# Patient Record
Sex: FEMALE | Race: WHITE | NOT HISPANIC OR LATINO | Employment: FULL TIME | URBAN - METROPOLITAN AREA
[De-identification: names, ages, dates, MRNs, and addresses within clinical notes are randomized per-mention and may not be internally consistent; named-entity substitution may affect disease eponyms.]

---

## 2023-11-01 ENCOUNTER — APPOINTMENT (OUTPATIENT)
Dept: NEUROSURGERY | Facility: CLINIC | Age: 53
End: 2023-11-01
Payer: COMMERCIAL

## 2023-11-02 PROBLEM — R92.8 ABNORMAL FINDING ON BREAST IMAGING: Status: ACTIVE | Noted: 2023-11-02

## 2023-11-03 ENCOUNTER — OFFICE VISIT (OUTPATIENT)
Dept: NEUROSURGERY | Facility: CLINIC | Age: 53
End: 2023-11-03
Payer: COMMERCIAL

## 2023-11-03 VITALS
HEART RATE: 100 BPM | SYSTOLIC BLOOD PRESSURE: 110 MMHG | WEIGHT: 114 LBS | DIASTOLIC BLOOD PRESSURE: 74 MMHG | BODY MASS INDEX: 22.38 KG/M2 | HEIGHT: 60 IN | TEMPERATURE: 97.7 F

## 2023-11-03 DIAGNOSIS — M54.12 RIGHT CERVICAL RADICULOPATHY: Primary | ICD-10-CM

## 2023-11-03 PROCEDURE — 1036F TOBACCO NON-USER: CPT | Performed by: NURSE PRACTITIONER

## 2023-11-03 PROCEDURE — 99204 OFFICE O/P NEW MOD 45 MIN: CPT | Performed by: NURSE PRACTITIONER

## 2023-11-03 RX ORDER — GABAPENTIN 300 MG/1
300 CAPSULE ORAL 2 TIMES DAILY
Qty: 60 CAPSULE | Refills: 0 | Status: SHIPPED | OUTPATIENT
Start: 2023-11-03 | End: 2024-02-07 | Stop reason: WASHOUT

## 2023-11-03 RX ORDER — GABAPENTIN 300 MG/1
300 CAPSULE ORAL 2 TIMES DAILY
COMMUNITY
Start: 2023-10-18 | End: 2023-11-03 | Stop reason: SDUPTHER

## 2023-11-03 ASSESSMENT — PATIENT HEALTH QUESTIONNAIRE - PHQ9
SUM OF ALL RESPONSES TO PHQ9 QUESTIONS 1 & 2: 0
2. FEELING DOWN, DEPRESSED OR HOPELESS: NOT AT ALL
1. LITTLE INTEREST OR PLEASURE IN DOING THINGS: NOT AT ALL

## 2023-11-03 ASSESSMENT — PAIN SCALES - GENERAL: PAINLEVEL: 1

## 2023-11-03 NOTE — PROGRESS NOTES
It was a pleasure to see Mikaela Canchola on 11/3/2023. She is a 53 y.o. year old, right-handed female who presents to the [facility] for evaluation of right cervical radiculopathy. Patient is referred by [facility]. No significant PMH.     Mikaela Canchola has had symptoms of RUE over the past 3 weeks, without inciting event. Progression of symptoms prompted today's visit. Thus far, patient has tried oral medications, and has just started PT / HEP with some improvement of symptoms. She denies change in bowel / bladder function, saddle anesthesia, imbalance, falls, difficulty dressing, difficulty holding / opening objects.     PREVIOUS TREATMENTS  PT current 11/01/2023, 10/25/2023  Home Exercise Program: daily since 10/25/2023  NSAIDs   Oral steroid   Gabapentin  Muscle relaxant  Topical    Previous Spine Surgery: No     Smoker: No   Anticoagulation / Antiplatelets: No     ROS: 12 / 12 systems reviewed and are negative unless noted in HPI    ON EXAM:  General: Well developed, awake/alert/oriented x 3, no distress, alert and cooperative  Skin: Warm and dry, no visible lesions / rashes  ENMT: Mucous membranes moist, no apparent injury  Head/Neck: No apparent injury. Full cervical spine ROM  Respiratory/Thorax: Normal breathing with good chest expansion, thorax symmetric  Cardiovascular: No pitting edema, no JVD  Gastrointestinal: Non-distended  NEUROLOGICAL EXAM:  EOMI, face symmetric  Motor Strength: DECREASED ELBOW FLEXION 4 / 5 on RIGHT; otherwise, 5/5 in BUE  Muscle Tone: Normal without spasticity or contractures in all extremities  Muscle Bulk: Normal and symmetric in all extremities  Posture:  -- Cervical: Normal  -- Thoracic: Normal  -- Lumbar : Normal  Paraspinal muscle spasm/tenderness absent.  No palpable tenderness along the spinous processes.  Sensation: DECREASED SENSORY IN RIGHT C6 DERMATOME; otherwise, SILT in BUE  Gait: Normal  Deep Tendon Reflexes: 2+ BUE, BLE     Christy's sign absent  Lhermitte Sign/Spurling Sign: MILD BILATERALLY  Finger escape sign: Absent   and release test: Negative    Mikaela Canchola has right cervical radiculopathy as evidenced on exam with SENSORY and MOTOR DEFICITS in RUE. Her symptoms of paresthesia, pain, weakness have improved with oral medications. We discussed rationale for dynamic cervical imaging and for continuation Physical Therapy for Home Exercise Program. Encouraged follow up in 4 weeks for reassessment. If improvement has not continued or if regression of symptoms, will request MRI CERVICAL Spine.  She verbalizes understanding and agreement. Refill of gabapentin sent to pharmacy of choice.  TOTAL TIME SPENT:   Time preparing / completing chart 7 MIN  Time spent face to face with patient, including counseling > 40 MIN

## 2023-11-10 ENCOUNTER — HOSPITAL ENCOUNTER (OUTPATIENT)
Dept: RADIOLOGY | Facility: HOSPITAL | Age: 53
Discharge: HOME | End: 2023-11-10
Payer: COMMERCIAL

## 2023-11-10 DIAGNOSIS — M54.12 RIGHT CERVICAL RADICULOPATHY: ICD-10-CM

## 2023-11-10 PROCEDURE — 72052 X-RAY EXAM NECK SPINE 6/>VWS: CPT

## 2023-11-10 PROCEDURE — 72052 X-RAY EXAM NECK SPINE 6/>VWS: CPT | Performed by: RADIOLOGY

## 2023-11-15 ENCOUNTER — TELEPHONE (OUTPATIENT)
Dept: NEUROSURGERY | Facility: CLINIC | Age: 53
End: 2023-11-15
Payer: COMMERCIAL

## 2023-12-06 ENCOUNTER — OFFICE VISIT (OUTPATIENT)
Dept: NEUROSURGERY | Facility: CLINIC | Age: 53
End: 2023-12-06
Payer: COMMERCIAL

## 2023-12-06 VITALS
DIASTOLIC BLOOD PRESSURE: 70 MMHG | SYSTOLIC BLOOD PRESSURE: 128 MMHG | TEMPERATURE: 98.4 F | BODY MASS INDEX: 21.99 KG/M2 | HEART RATE: 113 BPM | WEIGHT: 112 LBS | HEIGHT: 60 IN

## 2023-12-06 DIAGNOSIS — M54.12 RIGHT CERVICAL RADICULOPATHY: Primary | ICD-10-CM

## 2023-12-06 DIAGNOSIS — R20.2 PARESTHESIA OF RIGHT UPPER EXTREMITY: ICD-10-CM

## 2023-12-06 PROCEDURE — 1036F TOBACCO NON-USER: CPT | Performed by: NURSE PRACTITIONER

## 2023-12-06 PROCEDURE — 99213 OFFICE O/P EST LOW 20 MIN: CPT | Performed by: NURSE PRACTITIONER

## 2023-12-06 RX ORDER — DIAZEPAM 5 MG/1
5 TABLET ORAL 2 TIMES DAILY
Qty: 2 TABLET | Refills: 0 | Status: SHIPPED | OUTPATIENT
Start: 2023-12-06 | End: 2023-12-06 | Stop reason: SDUPTHER

## 2023-12-06 RX ORDER — DIAZEPAM 5 MG/1
5 TABLET ORAL EVERY 8 HOURS PRN
Qty: 30 TABLET | Refills: 0 | Status: SHIPPED | OUTPATIENT
Start: 2023-12-06 | End: 2023-12-06 | Stop reason: ENTERED-IN-ERROR

## 2023-12-06 RX ORDER — DIAZEPAM 5 MG/1
5 TABLET ORAL 2 TIMES DAILY
Qty: 2 TABLET | Refills: 0 | Status: SHIPPED | OUTPATIENT
Start: 2023-12-06 | End: 2024-02-07 | Stop reason: WASHOUT

## 2023-12-06 RX ORDER — CETIRIZINE HYDROCHLORIDE 10 MG/1
10 TABLET ORAL DAILY
COMMUNITY
Start: 2023-12-01

## 2023-12-06 RX ORDER — CODEINE PHOSPHATE AND GUAIFENESIN 10; 100 MG/5ML; MG/5ML
SOLUTION ORAL
COMMUNITY
Start: 2023-12-01 | End: 2024-02-07 | Stop reason: WASHOUT

## 2023-12-06 ASSESSMENT — PATIENT HEALTH QUESTIONNAIRE - PHQ9
1. LITTLE INTEREST OR PLEASURE IN DOING THINGS: NOT AT ALL
2. FEELING DOWN, DEPRESSED OR HOPELESS: NOT AT ALL
SUM OF ALL RESPONSES TO PHQ9 QUESTIONS 1 & 2: 0

## 2023-12-06 ASSESSMENT — ENCOUNTER SYMPTOMS: OCCASIONAL FEELINGS OF UNSTEADINESS: 0

## 2023-12-06 ASSESSMENT — PAIN SCALES - GENERAL: PAINLEVEL: 0-NO PAIN

## 2023-12-06 NOTE — PROGRESS NOTES
Mikaela Canchola is here today in follow up for right cervical radiculopathy and to review images. To review, she was initially evaluated on 11/03/2023. She reported RUE pain / paresthesia. On exam, she had motor weakness and sensory deficit in RUE. Orders were written for dynamic cervical spine x-rays and for PT.    Today, she feels that PT has not helped her symptoms. She continues with RUE pain / paresthesia. She had imaging completed and is here to review findings.    XR CERVICAL SPINE On 11/10/2023:  FINDINGS:  C-spine, 6 views  There is no fracture. There is no spondylolisthesis. There is mild  osteophytosis at C5-C6 without disc space narrowing      IMPRESSION:  Mild spondylosis at C5-C6. No malalignment or acute abnormality  Signed by: Bill Ortiz      TREATMENTS:  PT: 10/25/23, 11/1/23, 11/6/23 11/9/23, 11/9/23, 11/14/23, 11/21/23, 11/24/23, 11/28/23  Home exercise program: daily since 10/25/2023  Gabapentin    SMOKER: No  ANTICOAGULANT USE: No    ROS x 10 is, otherwise, negative unless documented in HPI above    On Exam: Appears comfortable  A&O x 4, speech clear / fluent  Respirations even / unlabored  Abdomen without distension  EPPS  Gait is steady    We reviewed cervical spine x-ray images with note of C5 - 6 disc space narrowing and foraminal narrowing noted on oblique views; we discussed MRI C SPINE since she has not responded to CONSERVATIVE MANAGEMENT SINCE 10/25/2023. MRI will be helpful to determine surgical vs non-surgical treatment of cervical radiculopathy given failure with conservative management.   TOTAL TIME:  Time preparing / completing chart: 7 MIN  Time in face to face contact with patient, including counseling: > 15 MIN  Nighat Bethea, APRN-CNP

## 2024-01-17 ENCOUNTER — HOSPITAL ENCOUNTER (OUTPATIENT)
Dept: RADIOLOGY | Facility: HOSPITAL | Age: 54
Discharge: HOME | End: 2024-01-17
Payer: COMMERCIAL

## 2024-01-17 DIAGNOSIS — R20.2 PARESTHESIA OF RIGHT UPPER EXTREMITY: ICD-10-CM

## 2024-01-17 DIAGNOSIS — M54.12 RIGHT CERVICAL RADICULOPATHY: ICD-10-CM

## 2024-01-17 PROCEDURE — 72141 MRI NECK SPINE W/O DYE: CPT

## 2024-01-17 PROCEDURE — 72141 MRI NECK SPINE W/O DYE: CPT | Performed by: RADIOLOGY

## 2024-02-07 ENCOUNTER — OFFICE VISIT (OUTPATIENT)
Dept: NEUROSURGERY | Facility: CLINIC | Age: 54
End: 2024-02-07
Payer: COMMERCIAL

## 2024-02-07 VITALS
SYSTOLIC BLOOD PRESSURE: 118 MMHG | HEART RATE: 106 BPM | HEIGHT: 60 IN | WEIGHT: 114 LBS | BODY MASS INDEX: 22.38 KG/M2 | DIASTOLIC BLOOD PRESSURE: 82 MMHG | TEMPERATURE: 98 F

## 2024-02-07 DIAGNOSIS — M54.12 RIGHT CERVICAL RADICULOPATHY: Primary | ICD-10-CM

## 2024-02-07 PROCEDURE — 1036F TOBACCO NON-USER: CPT | Performed by: NURSE PRACTITIONER

## 2024-02-07 PROCEDURE — 99214 OFFICE O/P EST MOD 30 MIN: CPT | Performed by: NURSE PRACTITIONER

## 2024-02-07 ASSESSMENT — PATIENT HEALTH QUESTIONNAIRE - PHQ9
2. FEELING DOWN, DEPRESSED OR HOPELESS: NOT AT ALL
SUM OF ALL RESPONSES TO PHQ9 QUESTIONS 1 & 2: 0
1. LITTLE INTEREST OR PLEASURE IN DOING THINGS: NOT AT ALL

## 2024-02-07 ASSESSMENT — PAIN SCALES - GENERAL: PAINLEVEL: 0-NO PAIN

## 2024-02-07 NOTE — PROGRESS NOTES
Mikaela Canchola is here today in follow up for right cervical radiculopathy and to review images. To review, she was last evaluated on 12/06/2023. She reported no improvement in right thumb paresthesia with PT / HEP.  Orders were written for MRI C Spine.    Today, she reports that she continues with symptoms. She had MRI C Spine imaging completed and is here to review findings.    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 Central canal stenosis is  most severe at C5-6. Disc extrusion at this level impresses on the  ventral cord. There is either artifact versus subtle signal  abnormality within the cord at this level which could reflect minimal  edema or myelomalacia.  Signed by: Rosalva Barfield     TREATMENTS:  PT: 10/25/23, 11/1/23, 11/6/23 11/9/23, 11/9/23, 11/14/23, 11/21/23, 11/24/23, 11/28/23  Home exercise program: daily since 10/25/2023  Gabapentin     SMOKER: No  ANTICOAGULANT USE: No    ROS x 10 is, otherwise, negative unless documented in HPI above    On Exam: Appears comfortable  A&O x 4, speech clear / fluent  Respirations even / unlabored  Abdomen without distension  EPPS  Gait    We reviewed cervical spine MRI images with pointing out of congenital narrowing of canal and central disc extrusion at C5 - 6, with compression of thecal sac / cord. We discussed follow up with neurosurgeon to discuss surgical options. She verbalizes understanding and agreement with plan. Prior CT C Spine done at [facility] [place], in 10/2023.  Nighat Bethea, APRN-CNP

## 2024-03-18 ENCOUNTER — APPOINTMENT (OUTPATIENT)
Dept: NEUROSURGERY | Facility: CLINIC | Age: 54
End: 2024-03-18
Payer: COMMERCIAL

## 2024-04-19 ENCOUNTER — OFFICE VISIT (OUTPATIENT)
Dept: NEUROSURGERY | Facility: CLINIC | Age: 54
End: 2024-04-19
Payer: COMMERCIAL

## 2024-04-19 VITALS
HEART RATE: 98 BPM | TEMPERATURE: 96.4 F | WEIGHT: 115 LBS | HEIGHT: 60 IN | SYSTOLIC BLOOD PRESSURE: 110 MMHG | DIASTOLIC BLOOD PRESSURE: 70 MMHG | BODY MASS INDEX: 22.58 KG/M2

## 2024-04-19 DIAGNOSIS — M50.20 HERNIATED CERVICAL DISC: Primary | ICD-10-CM

## 2024-04-19 DIAGNOSIS — M54.12 CERVICAL RADICULOPATHY AT C6: ICD-10-CM

## 2024-04-19 PROCEDURE — 1036F TOBACCO NON-USER: CPT | Performed by: NEUROLOGICAL SURGERY

## 2024-04-19 PROCEDURE — 99215 OFFICE O/P EST HI 40 MIN: CPT | Performed by: NEUROLOGICAL SURGERY

## 2024-04-19 RX ORDER — GABAPENTIN 300 MG/1
300 CAPSULE ORAL 2 TIMES DAILY
COMMUNITY
Start: 2024-03-28 | End: 2024-09-24

## 2024-04-19 ASSESSMENT — PAIN SCALES - GENERAL: PAINLEVEL: 10-WORST PAIN EVER

## 2024-04-19 ASSESSMENT — PATIENT HEALTH QUESTIONNAIRE - PHQ9: 2. FEELING DOWN, DEPRESSED OR HOPELESS: NOT AT ALL

## 2024-04-19 ASSESSMENT — ENCOUNTER SYMPTOMS: OCCASIONAL FEELINGS OF UNSTEADINESS: 0

## 2024-04-19 NOTE — PROGRESS NOTES
[facility] [place]  Department of Neurological Surgery  Established Patient Visit    History of Present Illness  Mikaela Canchola is a 53 y.o. year old female who presents to the spine clinic in follow up with with the present complaint of right thumb tingling type of numbness.  Her symptoms are limited now but they were extreme many months ago.  She woke up in the fall with some excruciating pain down her right arm and most of that eventually went away and she was left with numbness in the arm and then that even dissipated and left her with this thumb tingliness.  She is gone through physical therapy and eventually they did an MRI.  The MRI showed us that she has a disc osteophyte complex at C5-6 that is distorting the contours of the spinal cord at that level and it is really blocking up the foramen on the right side.  I can also see that she has a central disc protrusion at C4-5 which is effacing the cord but not obstructing the foramen.  And then we have a CAT scan that shows that the disc is slightly sclerotic and starting to have a calcified appearance at C5-6.  Patient specifically denies having trouble with her balance she is not weak in her arms.  She does not have trouble with fine motor coordination.  She has no difficulty with her buttons or her snaps or her zippers or tying shoelaces.    Patient's BMI is Body mass index is 22.46 kg/m².    14/14 systems reviewed and negative other than what is listed in the history of present illness    Patient Active Problem List   Diagnosis    Abnormal finding on breast imaging     No past medical history on file.  No past surgical history on file.  Social History     Tobacco Use    Smoking status: Never    Smokeless tobacco: Never   Substance Use Topics    Alcohol use: Not Currently     family history includes Breast cancer in an other family member; Colon cancer in her father and another family member.    Current Outpatient Medications:      gabapentin (Neurontin) 300 mg capsule, Take 1 capsule (300 mg) by mouth twice a day., Disp: , Rfl:     MULTIVITAMIN ORAL, Take by mouth., Disp: , Rfl:     cetirizine (ZyrTEC) 10 mg tablet, Take 1 tablet (10 mg) by mouth once daily., Disp: , Rfl:   Allergies   Allergen Reactions    Grass Pollen Unknown    House Dust Mite Unknown    Mold Unknown       Physical Examination:    General: NAD, AOx 3,  no aphasia or dysarthria, normal fund of knowledge  Cranial Nerves II-XII: VFF, PERRL, EOMI, Face Symm, Facial SILT, Palate/Tongue midline and symmetric  Motor: 5/5 Throughout all extremities,  No drift, no dysmetria on finger to nose  Sensation: SILT and PP throughout all extremities except for the tip of her right thumb  DTRS: 2+ Throughout except she has an absent biceps and brachioradialis on the right, No Hoffmans or Clonus  Her gait is normal nonantalgic and she can toe and heel walk without difficulty, she has a negative Romberg    Results:  I personally reviewed and interpreted the imaging results which included CAT scan of her C-spine and the plain x-rays of her C-spine and the MRI of her C-spine that are all described above    Assessment and Plan:      Mikaela Canchola is a 53 y.o. year old female who presents to the spine clinic in follow up with disc osteophyte complexes at C5-6 and C4-5 with some right C6 radiculopathy but very few active signs of myelopathy.  Since she has done significant standard therapy for this condition if she gets to the point where she is concerned enough about the effacement and distortion of her spinal cord at this level she is welcome to call us back and schedule the operation.  In my hands I would be recommending an anterior cervical disc excision with interbody fusion and plate fixation at C5-6 and a C4-5 arthroplasty.  She is a young woman and I think this would give her the optimum result.  I went over the operation with her in detail. We discussed risks,(these include but are not  limited to - bleeding, infection, paralysis, muscle weakness, CSF leak, bowel or bladder dysfunction, incomplete resolution of pain or numbness, DVT/PE, heart attack, stroke, and other unforeseen medical and anesthesia complications) benefits, and outcome possibilities as well as the alternatives to this form of therapy. She understands and would like to consider her options, she has had at least 3 or 4 opinions.  I told her if she wants to come back and see us were more than happy to see her again or make the arrangements for surgery which ever she prefers.      I have reviewed all prior documentation and reviewed the electronic medical record since admission. I have personally have reviewed all advanced imaging not just the reports and used my interpretation as documented as the relevant findings. I have reviewed the risks and benefits of all treatment recommendations listed in this note with the patient and family. I spent a total of 60 minutes in service to this patient's care during this date of service.      The above clinical summary has been dictated with voice recognition software. It has not been proofread for grammatical errors, typographical mistakes, or other semantic inconsistencies.    Thank you for visiting our office today. It was our pleasure to take part in your healthcare.     Do not hesitate to call with any questions regarding your plan of care after leaving. My office can be reached at [phone] M-Z 8am-4pm.     To clinicians, thank you very much for this kind referral. It is a privilege to partner with you in the care of your patients. My office would be delighted to assist you with any further consultations or with questions regarding the plan of care outlined. Do not hesitate to call the office or contact me directly.     Sincerely,    Kun Aguilar MD, FAANS, FACS  Board Certified Neurological Surgeon  , Department of Neurological Surgery  [facility]  [employer] [facility]  [address]  [address]  [place] [address]    [facility]  [address]  [address]  [place] [address]    Phone: [phone]  Fax: [phone]